# Patient Record
Sex: FEMALE | URBAN - NONMETROPOLITAN AREA
[De-identification: names, ages, dates, MRNs, and addresses within clinical notes are randomized per-mention and may not be internally consistent; named-entity substitution may affect disease eponyms.]

---

## 2023-11-21 ENCOUNTER — TRANSCRIBE ORDERS (OUTPATIENT)
Dept: ADMINISTRATIVE | Facility: HOSPITAL | Age: 53
End: 2023-11-21

## 2023-11-21 DIAGNOSIS — Z12.31 SCREENING MAMMOGRAM FOR BREAST CANCER: Primary | ICD-10-CM

## 2024-01-22 ENCOUNTER — PATIENT ROUNDING (BHMG ONLY) (OUTPATIENT)
Dept: UROLOGY | Facility: CLINIC | Age: 54
End: 2024-01-22
Payer: COMMERCIAL

## 2024-01-22 ENCOUNTER — OFFICE VISIT (OUTPATIENT)
Dept: UROLOGY | Facility: CLINIC | Age: 54
End: 2024-01-22
Payer: COMMERCIAL

## 2024-01-22 VITALS
HEIGHT: 64 IN | BODY MASS INDEX: 24.92 KG/M2 | SYSTOLIC BLOOD PRESSURE: 108 MMHG | WEIGHT: 146 LBS | DIASTOLIC BLOOD PRESSURE: 78 MMHG

## 2024-01-22 DIAGNOSIS — N39.46 MIXED STRESS AND URGE URINARY INCONTINENCE: ICD-10-CM

## 2024-01-22 DIAGNOSIS — N39.0 RECURRENT UTI: ICD-10-CM

## 2024-01-22 DIAGNOSIS — R31.21 ASYMPTOMATIC MICROSCOPIC HEMATURIA: ICD-10-CM

## 2024-01-22 DIAGNOSIS — N28.1 RENAL CYST: Primary | ICD-10-CM

## 2024-01-22 LAB
BILIRUB BLD-MCNC: NEGATIVE MG/DL
CLARITY, POC: CLEAR
COLOR UR: YELLOW
EXPIRATION DATE: ABNORMAL
GLUCOSE UR STRIP-MCNC: NEGATIVE MG/DL
KETONES UR QL: NEGATIVE
LEUKOCYTE EST, POC: NEGATIVE
Lab: ABNORMAL
NITRITE UR-MCNC: NEGATIVE MG/ML
PH UR: 5.5 [PH] (ref 5–8)
PROT UR STRIP-MCNC: NEGATIVE MG/DL
RBC # UR STRIP: ABNORMAL /UL
SP GR UR: 1.03 (ref 1–1.03)
UROBILINOGEN UR QL: NORMAL

## 2024-01-22 PROCEDURE — 99204 OFFICE O/P NEW MOD 45 MIN: CPT | Performed by: UROLOGY

## 2024-01-22 PROCEDURE — 81003 URINALYSIS AUTO W/O SCOPE: CPT | Performed by: UROLOGY

## 2024-01-22 RX ORDER — ALBUTEROL SULFATE 90 UG/1
AEROSOL, METERED RESPIRATORY (INHALATION)
COMMUNITY
Start: 2023-12-01

## 2024-01-22 RX ORDER — POLYETHYLENE GLYCOL 3350 17 G/17G
17 POWDER, FOR SOLUTION ORAL DAILY
Qty: 30 PACKET | Refills: 11 | Status: SHIPPED | OUTPATIENT
Start: 2024-01-22

## 2024-01-22 RX ORDER — ESTRADIOL 0.1 MG/G
2 CREAM VAGINAL 3 TIMES WEEKLY
Qty: 42.5 G | Refills: 12 | Status: SHIPPED | OUTPATIENT
Start: 2024-01-22

## 2024-01-22 RX ORDER — ALBUTEROL SULFATE 2.5 MG/3ML
SOLUTION RESPIRATORY (INHALATION)
COMMUNITY
Start: 2023-11-17

## 2024-01-22 NOTE — PROGRESS NOTES
"Chief Complaint  Chief Complaint   Patient presents with    Renal cyst     New patient       Referring Provider:  Neda Pickett, *    HPI  Ms. Marroquin is a 53 y.o. female with below past medical history who presents with reported microscopic hematuria and renal cysts.      Past Medical History  Past Medical History:   Diagnosis Date    Urinary tract infection        Past Surgical History  Past Surgical History:   Procedure Laterality Date    CHOLECYSTECTOMY      NECK SURGERY      TUBAL ABDOMINAL LIGATION         Medications    Current Outpatient Medications:     albuterol (PROVENTIL) (2.5 MG/3ML) 0.083% nebulizer solution, USE 3 ML VIA NEBULIZER THREE TIMES DAILY AS NEEDED, Disp: , Rfl:     albuterol sulfate  (90 Base) MCG/ACT inhaler, INHALE 2 PUFFS BY MOUTH EVERY 4 TO 6 HOURS AS NEEDED FOR WHEEZING/COUGH AND SHORTNESS OF AIR, Disp: , Rfl:     Allergies  No Known Allergies    Social History  Social History     Socioeconomic History    Marital status:    Tobacco Use    Smoking status: Every Day     Types: Cigarettes    Smokeless tobacco: Never   Vaping Use    Vaping Use: Never used   Substance and Sexual Activity    Alcohol use: Never    Drug use: Never    Sexual activity: Defer       Family History  History reviewed. No pertinent family history.       Physical Exam  Visit Vitals  /78 (BP Location: Left arm, Patient Position: Sitting, Cuff Size: Adult)   Ht 162.6 cm (64\")   Wt 66.2 kg (146 lb)   BMI 25.06 kg/m²     Physical exam was notable for normal .    Labs  Brief Urine Lab Results       None                 No results found for: \"GLUCOSE\", \"CALCIUM\", \"NA\", \"K\", \"CO2\", \"CL\", \"BUN\", \"CREATININE\", \"EGFRIFAFRI\", \"EGFRIFNONA\", \"BCR\", \"ANIONGAP\"    No results found for: \"WBC\", \"HGB\", \"HCT\", \"MCV\", \"PLT\"      Radiographic Studies  XR Ribs 2 Views Left  Result Date: 10/8/2023  Now acute displaced left-sided rib fractures. CRITICAL RESULT:   No. COMMUNICATION: Per this written report. Drafted " by Keisha Elder MD on 10/8/2023 12:19 PM Final report signed by Keisha Elder MD on 10/8/2023 12:23 PM      I have personally reviewed the patient's labs and relevant imaging.         Assessment  Ms. Marroquin is a 53 y.o. female with microscopic hematuria, urinary frequency, renal cysts, mixed incontinence (stress >> urge)    Plan  1. Urine for microscopy  2. CT Urogram  3. Cystoscopy and PVR  4. Start daily miralax       Barry Mendoza MD

## 2024-01-23 LAB
APPEARANCE UR: ABNORMAL
BACTERIA #/AREA URNS HPF: ABNORMAL /HPF
BILIRUB UR QL STRIP: NEGATIVE
CASTS URNS MICRO: ABNORMAL
COLOR UR: YELLOW
CRYSTALS URNS MICRO: ABNORMAL
EPI CELLS #/AREA URNS HPF: ABNORMAL /HPF
GLUCOSE UR QL STRIP: NEGATIVE
HGB UR QL STRIP: NEGATIVE
KETONES UR QL STRIP: NEGATIVE
LEUKOCYTE ESTERASE UR QL STRIP: NEGATIVE
MUCOUS THREADS URNS QL MICRO: ABNORMAL /HPF
NITRITE UR QL STRIP: NEGATIVE
PH UR STRIP: 5.5 [PH] (ref 5–8)
PROT UR QL STRIP: NEGATIVE
RBC #/AREA URNS HPF: ABNORMAL /HPF
SP GR UR STRIP: 1.03 (ref 1–1.03)
UROBILINOGEN UR STRIP-MCNC: ABNORMAL MG/DL
WBC #/AREA URNS HPF: ABNORMAL /HPF

## 2024-01-26 NOTE — PROGRESS NOTES
January 26, 2024    Hello, may I speak with Marylou Marroquin? Unable to reach pt.    My name is Em.      I am  with [facility]  [address]  [address]  [facility] [address] [phone].    Before we get started may I verify your date of birth? 1970    I am calling to officially welcome you to our practice and ask about your recent visit. Is this a good time to talk?     Tell me about your visit with us. What things went well?         We're always looking for ways to make our patients' experiences even better. Do you have recommendations on ways we may improve?      Overall were you satisfied with your first visit to our practice?        I appreciate you taking the time to speak with me today. Is there anything else I can do for you?       Thank you, and have a great day.

## 2024-02-13 ENCOUNTER — HOSPITAL ENCOUNTER (OUTPATIENT)
Dept: CT IMAGING | Facility: HOSPITAL | Age: 54
Discharge: HOME | End: 2024-02-13
Admitting: UROLOGY
Payer: COMMERCIAL

## 2024-02-13 DIAGNOSIS — N28.1 RENAL CYST: ICD-10-CM

## 2024-02-13 DIAGNOSIS — R31.21 ASYMPTOMATIC MICROSCOPIC HEMATURIA: ICD-10-CM

## 2024-02-13 PROCEDURE — 25510000001 IOPAMIDOL 61 % SOLUTION: Performed by: UROLOGY

## 2024-02-13 PROCEDURE — 74178 CT ABD&PLV WO CNTR FLWD CNTR: CPT

## 2024-02-13 RX ADMIN — IOPAMIDOL 100 ML: 612 INJECTION, SOLUTION INTRAVENOUS at 09:17

## 2024-05-23 ENCOUNTER — PROCEDURE VISIT (OUTPATIENT)
Dept: UROLOGY | Facility: CLINIC | Age: 54
End: 2024-05-23
Payer: COMMERCIAL

## 2024-05-23 DIAGNOSIS — N39.46 MIXED STRESS AND URGE URINARY INCONTINENCE: Primary | ICD-10-CM

## 2024-05-23 DIAGNOSIS — N39.0 RECURRENT UTI: ICD-10-CM

## 2024-05-23 DIAGNOSIS — R31.21 ASYMPTOMATIC MICROSCOPIC HEMATURIA: ICD-10-CM

## 2024-05-23 NOTE — PROGRESS NOTES
Preprocedure diagnosis  Microscopic hematuria    Postprocedure diagnosis  No abnormalities identified    Procedure  Flexible Cystourethroscopy    Attending surgeon  Barry Mendoza MD    Anesthesia  2% lidocaine jelly intraurethrally    Complications  None    Indications  53 y.o. female undergoing a flexible cystoscopy for the above mentioned indications.    Informed consent was obtained.      Findings  Cystoscopy revealed one right and left ureteral orifice in the normal anatomic position, normal bladder mucosa and no tumors, masses or stones.  On physical exam she did have evidence of urinary leakage with cough and lack of urethral hypermobility, indicating ISD.    Procedure  The patient was placed in supine position and prepped and draped in sterile fashion with lidocaine jelly per urethra for anesthesia.  A timeout was performed.  The 14F flexible cystoscope was lubricated and gently placed through the urethra and into the bladder.  The bladder was completely visualized.  The cystoscope was retroflexed and the bladder neck visualized.  The scope was withdrawn and the procedure terminated.  The patient tolerated the procedure well.      CC  Mixed incontinence    HPI  Ms. Marroquin is a 53 y.o. female with history below in assessment, who presents for follow up.     At this visit patient was moved to a separate room in separate encounter to discuss her incontinence    Past Medical History:   Diagnosis Date    Urinary tract infection        Past Surgical History:   Procedure Laterality Date    CHOLECYSTECTOMY      NECK SURGERY      TUBAL ABDOMINAL LIGATION           Current Outpatient Medications:     albuterol (PROVENTIL) (2.5 MG/3ML) 0.083% nebulizer solution, USE 3 ML VIA NEBULIZER THREE TIMES DAILY AS NEEDED, Disp: , Rfl:     albuterol sulfate  (90 Base) MCG/ACT inhaler, INHALE 2 PUFFS BY MOUTH EVERY 4 TO 6 HOURS AS NEEDED FOR WHEEZING/COUGH AND SHORTNESS OF AIR, Disp: , Rfl:     estradiol (ESTRACE)  "0.1 MG/GM vaginal cream, Insert 2 applicators into the vagina 3 (Three) Times a Week., Disp: 42.5 g, Rfl: 12    polyethylene glycol (MIRALAX) 17 g packet, Take 17 g by mouth Daily., Disp: 30 packet, Rfl: 11     Physical Exam  There were no vitals taken for this visit.    Labs  Brief Urine Lab Results  (Last result in the past 365 days)        Color   Clarity   Blood   Leuk Est   Nitrite   Protein   CREAT   Urine HCG        01/22/24 1016 Yellow   Clear   1+   Negative   Negative   Negative                   No results found for: \"GLUCOSE\", \"CALCIUM\", \"NA\", \"K\", \"CO2\", \"CL\", \"BUN\", \"CREATININE\", \"EGFRIFAFRI\", \"EGFRIFNONA\", \"BCR\", \"ANIONGAP\"    No results found for: \"WBC\", \"HGB\", \"HCT\", \"MCV\", \"PLT\"         Radiographic Studies  CT Abdomen Pelvis With & Without Contrast  Result Date: 2/13/2024  1. Minimal right nephrolithiasis. 2. No enhancing renal mass. 3. Small renal cysts with mild right renal scarring.   This study was performed with techniques to keep radiation doses as low as reasonably achievable (ALARA). Individualized dose reduction techniques using automated exposure control or adjustment of vA and/or kV according to the patient size were employed.  This report was signed and finalized on 2/13/2024 10:00 AM by Natanael Polanco MD.        I have reviewed the above labs and imaging.     Assessment  53 y.o. female with microscopic hematuria, likely secondary to small right renal stone.  No abnormalities on cystoscopy today.  She did have stress urinary incontinence and likely ISD.    She is much more bothered by the stress incontinence than she has the urge incontinence.  We therefore had a discussion in a separate room in separate encounter about treatment options for stress incontinence with the risks and benefits including pelvic floor physical therapy, which she has already tried, bulking agent, mid urethral sling.     Plan  1.  Schedule for MUS at surgery center  2.  I also offered her OAB medication - start " Mrybetriq

## 2024-06-21 ENCOUNTER — TELEPHONE (OUTPATIENT)
Dept: UROLOGY | Facility: CLINIC | Age: 54
End: 2024-06-21

## 2024-06-21 NOTE — TELEPHONE ENCOUNTER
Provider: DR ORNELAS    Caller: MELVIN BARAHONA    Relationship to Patient: SELF      Reason for Call: PT CALLED TO CONFIRM IF SHE WILL BE ABLE TO TRAVEL AFTER SURGERY ON 6/25/24    PT HAS TRIP TO [place] ON 7/9/24 FOR 10DAYS, AND WILL BE FLYING.    PLEASE CALL TO DISCUSS.